# Patient Record
Sex: FEMALE | Race: WHITE | NOT HISPANIC OR LATINO | Employment: FULL TIME | URBAN - METROPOLITAN AREA
[De-identification: names, ages, dates, MRNs, and addresses within clinical notes are randomized per-mention and may not be internally consistent; named-entity substitution may affect disease eponyms.]

---

## 2017-01-03 ENCOUNTER — TELEPHONE (OUTPATIENT)
Dept: OBSTETRICS AND GYNECOLOGY | Age: 42
End: 2017-01-03

## 2017-01-11 ENCOUNTER — OFFICE VISIT (OUTPATIENT)
Dept: OBSTETRICS AND GYNECOLOGY | Age: 42
End: 2017-01-11

## 2017-01-11 VITALS
HEIGHT: 65 IN | BODY MASS INDEX: 22.33 KG/M2 | SYSTOLIC BLOOD PRESSURE: 112 MMHG | DIASTOLIC BLOOD PRESSURE: 72 MMHG | WEIGHT: 134 LBS

## 2017-01-11 DIAGNOSIS — N94.19 DYSPAREUNIA DUE TO MEDICAL CONDITION IN FEMALE: ICD-10-CM

## 2017-01-11 DIAGNOSIS — K62.5 RECTAL BLEEDING: ICD-10-CM

## 2017-01-11 DIAGNOSIS — Z98.890 POST-OPERATIVE STATE: ICD-10-CM

## 2017-01-11 DIAGNOSIS — N80.9 ENDOMETRIOSIS DETERMINED BY LAPAROSCOPY: Primary | ICD-10-CM

## 2017-01-11 PROCEDURE — 99024 POSTOP FOLLOW-UP VISIT: CPT | Performed by: OBSTETRICS & GYNECOLOGY

## 2017-01-11 NOTE — MR AVS SNAPSHOT
Alyssa HERNANDEZ Francine   1/11/2017 8:45 AM   Office Visit    Dept Phone:  [phone]   Encounter #:  [id number]    Provider:  Cesia Huerta MD   Department:  [facility]                Your Full Care Plan              Your Updated Medication List          This list is accurate as of: 1/11/17  9:05 AM.  Always use your most recent med list.                buPROPion  MG 24 hr tablet   Commonly known as:  WELLBUTRIN XL       FLUoxetine 20 MG capsule   Commonly known as:  PROzac       ibuprofen 600 MG tablet   Commonly known as:  ADVIL,MOTRIN   Take 1 tablet by mouth Every 6 (Six) Hours As Needed for mild pain (1-3).       loratadine 10 MG tablet   Commonly known as:  CLARITIN       Progesterone Micronized 10 % cream       saccharomyces boulardii 250 MG capsule   Commonly known as:  FLORASTOR       traZODone 100 MG tablet   Commonly known as:  DESYREL               You Were Diagnosed With        Codes Comments    Rectal bleeding    -  Primary ICD-10-CM: K62.5  ICD-9-CM: 569.3       Instructions     None    Patient Instructions History      Upcoming Appointments     Visit Type Date Time Department    POST-OP 1/11/2017  8:45 AM [facility]      [employer] Signup     Our records indicate that you have an active [employer] account.    You can view your After Visit Summary by going to [employer] and logging in with your [employer] username and password.  If you don't have a [employer] username and password but a parent or guardian has access to your record, the parent or guardian should login with their own [employer] username and password and access your record to view the After Visit Summary.    If you have questions, you can email [email or web address] or call [phone] to talk to our [employer] staff.  Remember, [employer] is NOT to be used for urgent needs.  For medical emergencies, dial 911.               Other Info from Your Visit  "          Allergies     Ciprofloxacin  Other (See Comments)    BODY ACHES    Metronidazole  Hives      Reason for Visit     Post-op constipation       Vital Signs     Blood Pressure Height Weight Last Menstrual Period Body Mass Index Smoking Status    112/72 65\" (165.1 cm) 134 lb (60.8 kg) 12/13/2016 22.3 kg/m2 Never Smoker      Problems and Diagnoses Noted     Rectal bleeding    -  Primary        "

## 2017-01-11 NOTE — PROGRESS NOTES
"Subjective     Alyssa Escamilla is a 41 y.o. female who presents to the clinic 4 weeks status post laparoscopic bilateral salpingectomy for sterilization. Endometriosis noted during surgery and implants cauterized.  Eating a regular diet without difficulty. Bowel movements are abnormal with consitpation and rectal bleeding (bright red). The patient is not having any pain. Pt had h/o severe dysmenorrhea and dyspareunia. Declines hormones for haven't tried ocps.    The following portions of the patient's history were reviewed and updated as appropriate: allergies, current medications, past family history, past medical history, past social history, past surgical history and problem list.    Review of Systems  Pertinent items are noted in HPI.      Objective       Visit Vitals   • /72   • Ht 65\" (165.1 cm)   • Wt 134 lb (60.8 kg)   • LMP 12/13/2016   • BMI 22.3 kg/m2     General:  alert, appears stated age and cooperative   Abdomen: soft, bowel sounds active, non-tender   Incision:   healing well, no drainage, no erythema, no hernia, no seroma, no swelling, well approximated, no dehiscence, incision well approximated         Assessment      Doing well postoperatively.  Operative findings again reviewed. Pathology report discussed (tubes benign) pictures given     Plan     1. Continue any current medications.  2. Wound care discussed.  3. Activity restrictions: none  4. Anticipated return to work: not applicable.  5. Follow up: annual and prn    Discussed endometriosis, offer depot lupron for tx, info give, could also try progesterone only pills for menorrhagia/dysmenorrhea but pt has declines hormones in past, will also refer to colorectal surg for bright red rectal bleeding  "

## 2017-02-24 ENCOUNTER — OFFICE VISIT (OUTPATIENT)
Dept: SURGERY | Facility: CLINIC | Age: 42
End: 2017-02-24

## 2017-02-24 VITALS
SYSTOLIC BLOOD PRESSURE: 122 MMHG | DIASTOLIC BLOOD PRESSURE: 74 MMHG | OXYGEN SATURATION: 98 % | RESPIRATION RATE: 20 BRPM | TEMPERATURE: 98.5 F | WEIGHT: 131 LBS | HEART RATE: 88 BPM | HEIGHT: 65 IN | BODY MASS INDEX: 21.83 KG/M2

## 2017-02-24 DIAGNOSIS — K62.5 RECTUM BLEEDING: Primary | ICD-10-CM

## 2017-02-24 PROCEDURE — 99244 OFF/OP CNSLTJ NEW/EST MOD 40: CPT | Performed by: COLON & RECTAL SURGERY

## 2017-02-24 PROCEDURE — 46600 DIAGNOSTIC ANOSCOPY SPX: CPT | Performed by: COLON & RECTAL SURGERY

## 2017-02-24 NOTE — PROGRESS NOTES
Alyssa Escamilla is a 41 y.o. female who is seen as a consult at the request of Cesia Huerta MD for Rectal Bleeding.      HPI:      Past Medical History   Diagnosis Date   • Allergic rhinitis    • Anxiety    • Anxiety and depression    • Cataract      LIGIA   • Chronic headache    • Depression    • Drug overdose, intentional 2016   • Endometriosis    • Fatigue    • IBS (irritable bowel syndrome)    • Infectious colitis    • Insomnia    • Lower back pain    • Occipital neuralgia    • Proteinuria    • Recovering alcoholic in remission      OVER 100 DAYS   • Substance abuse      ALCOHOL IN REMISSION QUIT 3/23/2004   • Vaginal candidiasis        Past Surgical History   Procedure Laterality Date   •  section     • Diagnostic laparoscopy Bilateral 2016     Procedure: SALPINGECTOMY LAPAROSCOPIC;  Surgeon: Cesia Huerta MD;  [place] [facility] [place] [facility] [place]:    • Contraceptive capsule removal  10/13/2016     IUD REMOVAL    • Salpingectomy Bilateral    • Colonoscopy              Social History:   reports that she has quit smoking. She quit after 10.00 years of use. She has never used smokeless tobacco. She reports that she does not drink alcohol or use illicit drugs.      Marriage status:     Family History   Problem Relation Age of Onset   • Hypertension Other    • Depression Other    • Hyperlipidemia Other    • Colon polyps Mother    • Prostate cancer Father    • Heart disease Father    • Diabetes Sister          Current Outpatient Prescriptions:   •  buPROPion XL (WELLBUTRIN XL) 300 MG 24 hr tablet, Take 300 mg by mouth Every Morning., Disp: , Rfl:   •  FLUoxetine (PROzac) 20 MG capsule, Take 40 mg by mouth Every Morning. Takes 40 mg during menstrual cycle., Disp: , Rfl:   •  ibuprofen (ADVIL,MOTRIN) 600 MG tablet, Take 1 tablet by mouth Every 6 (Six) Hours As Needed for mild pain (1-3)., Disp: 30 tablet, Rfl: 0  •  loratadine (CLARITIN) 10 MG tablet, Take 10 mg by  mouth Daily As Needed., Disp: , Rfl:   •  Progesterone Micronized 10 % cream, Place 1 application on the skin 2 (Two) Times a Day., Disp: , Rfl:   •  saccharomyces boulardii (FLORASTOR) 250 MG capsule, Take 250 mg by mouth Every Morning., Disp: , Rfl:   •  traZODone (DESYREL) 100 MG tablet, Take 100 mg by mouth Every Night., Disp: , Rfl:     Allergy  Ciprofloxacin and Metronidazole    Review of Systems   HENT: Positive for headaches.    Hematologic/Lymphatic: Bruises/bleeds easily.   Musculoskeletal: Positive for back pain and joint pain.   Gastrointestinal: Positive for abdominal pain, change in bowel habit and constipation.   Psychiatric/Behavioral: The patient has insomnia.    All other systems reviewed and are negative.      Vitals:    02/24/17 0839   BP: 122/74   Pulse: 88   Resp: 20   Temp: 98.5 °F (36.9 °C)   SpO2: 98%     Body mass index is 21.8 kg/(m^2).    Physical Exam    Review of Medical Record:    Assessment:  No diagnosis found.    Plan:      EMR Dragon/Transcription disclaimer:   Much of this encounter note is an electronic transcription/translation of spoken language to printed text. The electronic translation of spoken language may permit erroneous, or at times, nonsensical words or phrases to be inadvertently transcribed; Although I have reviewed the note for such errors, some may still exist.

## 2017-02-24 NOTE — PROGRESS NOTES
Alyssa Escamilla is a 41 y.o. female who is seen as a consult at the request of Cesia Huerta MD for Rectal Bleeding.      HPI:  Pt c/o rectal bleeding.  It happened after gyn surgery.  H/o polyps  H/o ibs  No meds to control ibs  Blood with bm - few days after surgery  Constipation worsening since surgery  Tried glycerin supp  Fleet - helped a little  One ducoloax - cramping and vomitting  miralax - helped  denies abd pain or cramping  A lot of straining  bm from [place] 1-4  Rb last episode this am - on tp    Fiber - no   Ss- miralax  Probiotic no    No cream  No swelling or tissue perianally      Past Medical History   Diagnosis Date   • Allergic rhinitis    • Anxiety    • Anxiety and depression    • Cataract      LIGIA   • Chronic headache    • Depression    • Drug overdose, intentional 2016   • Endometriosis    • Fatigue    • IBS (irritable bowel syndrome)    • Infectious colitis    • Insomnia    • Lower back pain    • Occipital neuralgia    • Proteinuria    • Recovering alcoholic in remission      OVER 100 DAYS   • Substance abuse      ALCOHOL IN REMISSION QUIT 3/23/2004   • Vaginal candidiasis        Past Surgical History   Procedure Laterality Date   •  section     • Diagnostic laparoscopy Bilateral 2016     Procedure: SALPINGECTOMY LAPAROSCOPIC;  Surgeon: Cesia Huerta MD;  [place] [facility] [place] [facility] [place];  Service:    • Contraceptive capsule removal  10/13/2016     IUD REMOVAL    • Salpingectomy Bilateral    • Colonoscopy              Social History:   reports that she has quit smoking. She quit after 10.00 years of use. She has never used smokeless tobacco. She reports that she does not drink alcohol or use illicit drugs.      Marriage status:     Family History   Problem Relation Age of Onset   • Hypertension Other    • Depression Other    • Hyperlipidemia Other    • Colon polyps Mother    • Prostate cancer Father    • Heart disease Father    • Diabetes  Sister          Current Outpatient Prescriptions:   •  buPROPion XL (WELLBUTRIN XL) 300 MG 24 hr tablet, Take 300 mg by mouth Every Morning., Disp: , Rfl:   •  FLUoxetine (PROzac) 20 MG capsule, Take 40 mg by mouth Every Morning. Takes 40 mg during menstrual cycle., Disp: , Rfl:   •  ibuprofen (ADVIL,MOTRIN) 600 MG tablet, Take 1 tablet by mouth Every 6 (Six) Hours As Needed for mild pain (1-3)., Disp: 30 tablet, Rfl: 0  •  loratadine (CLARITIN) 10 MG tablet, Take 10 mg by mouth Daily As Needed., Disp: , Rfl:   •  Progesterone Micronized 10 % cream, Place 1 application on the skin 2 (Two) Times a Day., Disp: , Rfl:   •  saccharomyces boulardii (FLORASTOR) 250 MG capsule, Take 250 mg by mouth Every Morning., Disp: , Rfl:   •  traZODone (DESYREL) 100 MG tablet, Take 100 mg by mouth Every Night., Disp: , Rfl:     Allergy  Ciprofloxacin and Metronidazole    Review of Systems   Constitution: Negative for decreased appetite, weakness and weight gain.   HENT: Positive for headaches. Negative for congestion, hearing loss and hoarse voice.    Eyes: Negative for blurred vision, discharge and visual disturbance.   Cardiovascular: Negative for chest pain, cyanosis and leg swelling.   Respiratory: Negative for cough, shortness of breath, sleep disturbances due to breathing and snoring.    Endocrine: Negative for cold intolerance and heat intolerance.   Hematologic/Lymphatic: Bruises/bleeds easily.   Skin: Negative for itching, poor wound healing and skin cancer.   Musculoskeletal: Positive for back pain and joint pain. Negative for arthritis and joint swelling.   Gastrointestinal: Positive for abdominal pain, change in bowel habit and constipation. Negative for bowel incontinence.   Genitourinary: Negative for bladder incontinence, dysuria and hematuria.   Neurological: Negative for brief paralysis, excessive daytime sleepiness, dizziness, focal weakness and light-headedness.   Psychiatric/Behavioral: Negative for altered mental  status and hallucinations. The patient has insomnia.    Allergic/Immunologic: Negative for HIV exposure and persistent infections.   All other systems reviewed and are negative.      Vitals:    02/24/17 0839   BP: 122/74   Pulse: 88   Resp: 20   Temp: 98.5 °F (36.9 °C)   SpO2: 98%     Body mass index is 21.8 kg/(m^2).    Physical Exam   Constitutional: She is oriented to person, place, and time. She appears well-developed and well-nourished. No distress.   HENT:   Head: Normocephalic and atraumatic.   Nose: Nose normal.   Mouth/Throat: Oropharynx is clear and moist.   Eyes: Conjunctivae and EOM are normal. Pupils are equal, round, and reactive to light.   Neck: Normal range of motion. No tracheal deviation present.   Pulmonary/Chest: Effort normal and breath sounds normal. No respiratory distress.   Abdominal: Soft. Bowel sounds are normal. She exhibits no distension.   Genitourinary:   Genitourinary Comments: Perianal exam: external hem - ra tag minor  BASILIO- good tone, no masses  Anoscopy performed:  Grade 2 x 1 internal hem     Musculoskeletal: Normal range of motion. She exhibits no edema or deformity.   Neurological: She is alert and oriented to person, place, and time. No cranial nerve deficit. Coordination and gait normal.   Skin: Skin is warm and dry.   Psychiatric: She has a normal mood and affect. Her behavior is normal. Judgment normal.       Assessment:  1. Rectum bleeding        Plan:  For the rectal bleeding, I recommend colonoscopy to rule out major sources of bleeding other than hemorrhoids. At the time of colonoscopy if there are no serious issues found at that time, I recommend doing rubber band ligation of the enlarged internal hemorrhoids.  I described risk, benefits and alternatives to the patient.  I described to patient typical post procedure recovery, typical outcomes, and 85% success rate. The patient wishes to proceed.      EMR Dragon/Transcription disclaimer:   Much of this encounter note is  an electronic transcription/translation of spoken language to printed text. The electronic translation of spoken language may permit erroneous, or at times, nonsensical words or phrases to be inadvertently transcribed; Although I have reviewed the note for such errors, some may still exist.

## 2017-04-04 ENCOUNTER — OFFICE VISIT (OUTPATIENT)
Dept: SURGERY | Facility: CLINIC | Age: 42
End: 2017-04-04

## 2017-04-04 VITALS
SYSTOLIC BLOOD PRESSURE: 118 MMHG | DIASTOLIC BLOOD PRESSURE: 76 MMHG | HEART RATE: 84 BPM | HEIGHT: 65 IN | BODY MASS INDEX: 21.33 KG/M2 | TEMPERATURE: 98.2 F | OXYGEN SATURATION: 98 % | WEIGHT: 128 LBS

## 2017-04-04 DIAGNOSIS — K59.09 CHRONIC CONSTIPATION: ICD-10-CM

## 2017-04-04 DIAGNOSIS — K64.4 ANAL SKIN TAG: ICD-10-CM

## 2017-04-04 DIAGNOSIS — K62.89 RECTAL PAIN: Primary | ICD-10-CM

## 2017-04-04 PROCEDURE — 99213 OFFICE O/P EST LOW 20 MIN: CPT | Performed by: PHYSICIAN ASSISTANT

## 2017-04-04 RX ORDER — HYDROCORTISONE ACETATE 25 MG/1
25 SUPPOSITORY RECTAL 2 TIMES DAILY
Qty: 20 SUPPOSITORY | Refills: 0 | Status: SHIPPED | OUTPATIENT
Start: 2017-04-04 | End: 2017-04-11

## 2017-04-04 RX ORDER — FLUOXETINE HYDROCHLORIDE 40 MG/1
CAPSULE ORAL
COMMUNITY
Start: 2017-03-24 | End: 2017-04-04 | Stop reason: SDUPTHER

## 2017-04-04 NOTE — PROGRESS NOTES
"Alyssa Escamilla is a 41 y.o. female in for follow up of rectal pain    Patient states that she has had a new episode of rectal pain    Had previously canceled colonoscopy with rubber band ligation due to insurance deductible issues    This weekend, she had an episode of bad stomach cramping  She was on the toilet for 45 minutes     Had bowel movement, which was painful    Stool soft    After bowel movement, in shower, felt grape-sized bump at anus    No blood    No drainage    No fever or chills    No nausea or vomiting    Usually has one bowel movement per week    Long-standing history of constipation    She does not think that MiraLAX has been helpful    Admits has not been \"diligent\" about fiber    As used Preparation H cream, in case tissue at anus was swelling  Denies anorectal pain or itching except with bowel movement      /76  Pulse 84  Temp 98.2 °F (36.8 °C)  Ht 65\" (165.1 cm)  Wt 128 lb (58.1 kg)  SpO2 98%  BMI 21.3 kg/m2  Body mass index is 21.3 kg/(m^2).      PE:  Physical Exam   Constitutional: She is oriented to person, place, and time. She appears well-developed and well-nourished. No distress.   HENT:   Head: Normocephalic and atraumatic.   Eyes: Pupils are equal, round, and reactive to light.   Neck: No tracheal deviation present.   Pulmonary/Chest: Effort normal. No respiratory distress.   Abdominal:   -soft, non-tender, non-distended   Genitourinary:   Genitourinary Comments: Perianal exam: external: tag mildly inflamed  BASILIO- good tone, no masses  No blood or drainage.  No fluctuance or induration.   Neurological: She is alert and oriented to person, place, and time.   Skin: Skin is warm and dry. She is not diaphoretic.   Psychiatric: She has a normal mood and affect. Her behavior is normal.   Vitals reviewed.        Assessment:   1. Rectal pain    2. Chronic constipation    3. Anal skin tag         Plan:    -rx anusol suppositories bid x 10 d for anal swelling/discomfort.  " Discussed that Anusol suppositories will help calm inflammation in the short term, but she needs to address root cause of problem, which is constipation/straining  -rx Linzess for chronic constipation, as symptoms not well managed with MiraLAX stool softener and fiber  -reschedule colonoscopy with rubber band ligation  -discussed return precautions, including but not limited to: call or come in if any fever/chills, nausea/vomiting, new/worsening rectal pain or pressure, new/worsening anorectal bleeding or drainage (especially if malodorous), difficulty with bowel/bladder function. Patient verbalizes understanding, and states that she will call or come in if she experiences these or any other concerning symptoms    RTC 4 weeks    Blanka De Anda PA-C 4/4/2017  12:46 PM     20 minutes spent face-to-face with patient, 15 of 20 minutes spent in counseling    EMR Dragon/Transcription disclaimer:   Much of this encounter note is an electronic transcription/translation of spoken language to printed text. The electronic translation of spoken language may permit erroneous, or at times, nonsensical words or phrases to be inadvertently transcribed; Although I have reviewed the note for such errors, some may still exist.

## 2017-04-20 ENCOUNTER — HOSPITAL ENCOUNTER (OUTPATIENT)
Facility: HOSPITAL | Age: 42
Setting detail: HOSPITAL OUTPATIENT SURGERY
Discharge: HOME | End: 2017-04-20
Attending: COLON & RECTAL SURGERY | Admitting: COLON & RECTAL SURGERY

## 2017-04-20 ENCOUNTER — ANESTHESIA EVENT (OUTPATIENT)
Dept: GASTROENTEROLOGY | Facility: HOSPITAL | Age: 42
End: 2017-04-20

## 2017-04-20 ENCOUNTER — ANESTHESIA (OUTPATIENT)
Dept: GASTROENTEROLOGY | Facility: HOSPITAL | Age: 42
End: 2017-04-20

## 2017-04-20 VITALS
WEIGHT: 128.13 LBS | SYSTOLIC BLOOD PRESSURE: 108 MMHG | TEMPERATURE: 98.2 F | HEART RATE: 67 BPM | OXYGEN SATURATION: 100 % | HEIGHT: 65 IN | DIASTOLIC BLOOD PRESSURE: 79 MMHG | BODY MASS INDEX: 21.35 KG/M2 | RESPIRATION RATE: 16 BRPM

## 2017-04-20 DIAGNOSIS — K62.5 RECTUM BLEEDING: ICD-10-CM

## 2017-04-20 LAB
B-HCG UR QL: NEGATIVE
INTERNAL NEGATIVE CONTROL: NEGATIVE
INTERNAL POSITIVE CONTROL: POSITIVE
Lab: NORMAL

## 2017-04-20 PROCEDURE — 25010000002 PROPOFOL 10 MG/ML EMULSION: Performed by: ANESTHESIOLOGY

## 2017-04-20 PROCEDURE — 45398 COLONOSCOPY W/BAND LIGATION: CPT | Performed by: COLON & RECTAL SURGERY

## 2017-04-20 RX ORDER — OXYCODONE HYDROCHLORIDE AND ACETAMINOPHEN 5; 325 MG/1; MG/1
TABLET ORAL
Qty: 24 TABLET | Refills: 0 | Status: SHIPPED | OUTPATIENT
Start: 2017-04-20 | End: 2018-03-30

## 2017-04-20 RX ORDER — LIDOCAINE HYDROCHLORIDE 20 MG/ML
INJECTION, SOLUTION INFILTRATION; PERINEURAL AS NEEDED
Status: DISCONTINUED | OUTPATIENT
Start: 2017-04-20 | End: 2017-04-20 | Stop reason: SURG

## 2017-04-20 RX ORDER — FENTANYL CITRATE 50 UG/ML
25 INJECTION, SOLUTION INTRAMUSCULAR; INTRAVENOUS
Status: DISCONTINUED | OUTPATIENT
Start: 2017-04-20 | End: 2017-04-20 | Stop reason: HOSPADM

## 2017-04-20 RX ORDER — SODIUM CHLORIDE, SODIUM LACTATE, POTASSIUM CHLORIDE, CALCIUM CHLORIDE 600; 310; 30; 20 MG/100ML; MG/100ML; MG/100ML; MG/100ML
1000 INJECTION, SOLUTION INTRAVENOUS CONTINUOUS PRN
Status: DISCONTINUED | OUTPATIENT
Start: 2017-04-20 | End: 2017-04-20 | Stop reason: HOSPADM

## 2017-04-20 RX ORDER — ONDANSETRON 2 MG/ML
4 INJECTION INTRAMUSCULAR; INTRAVENOUS ONCE AS NEEDED
Status: DISCONTINUED | OUTPATIENT
Start: 2017-04-20 | End: 2017-04-20 | Stop reason: HOSPADM

## 2017-04-20 RX ORDER — PROPOFOL 10 MG/ML
VIAL (ML) INTRAVENOUS CONTINUOUS PRN
Status: DISCONTINUED | OUTPATIENT
Start: 2017-04-20 | End: 2017-04-20 | Stop reason: SURG

## 2017-04-20 RX ORDER — BUPIVACAINE HYDROCHLORIDE AND EPINEPHRINE 5; 5 MG/ML; UG/ML
INJECTION, SOLUTION EPIDURAL; INTRACAUDAL; PERINEURAL AS NEEDED
Status: DISCONTINUED | OUTPATIENT
Start: 2017-04-20 | End: 2017-04-20 | Stop reason: HOSPADM

## 2017-04-20 RX ORDER — PROPOFOL 10 MG/ML
VIAL (ML) INTRAVENOUS AS NEEDED
Status: DISCONTINUED | OUTPATIENT
Start: 2017-04-20 | End: 2017-04-20 | Stop reason: SURG

## 2017-04-20 RX ADMIN — SODIUM CHLORIDE, POTASSIUM CHLORIDE, SODIUM LACTATE AND CALCIUM CHLORIDE 1000 ML: 600; 310; 30; 20 INJECTION, SOLUTION INTRAVENOUS at 13:56

## 2017-04-20 RX ADMIN — PROPOFOL 160 MCG/KG/MIN: 10 INJECTION, EMULSION INTRAVENOUS at 15:30

## 2017-04-20 RX ADMIN — LIDOCAINE HYDROCHLORIDE 100 MG: 20 INJECTION, SOLUTION INFILTRATION; PERINEURAL at 15:17

## 2017-04-20 RX ADMIN — PROPOFOL 200 MG: 10 INJECTION, EMULSION INTRAVENOUS at 15:17

## 2017-04-20 RX ADMIN — PROPOFOL 300 MCG/KG/MIN: 10 INJECTION, EMULSION INTRAVENOUS at 15:17

## 2017-04-20 NOTE — DISCHARGE INSTRUCTIONS
For the next 24 hours patient needs to be with a responsible adult.    For 24 hours DO NOT drive, operate machinery, appliances, drink alcohol, make important decisions or sign legal documents.    Start with a light or bland diet and advance to regular diet as tolerated.    Follow recommendations on procedure report provided by your doctor.    Call Dr Avery for problems [phone]    Problems may include but not limited to: large amounts of bleeding, trouble breathing, repeated vomiting, severe unrelieved pain, fever or chills.

## 2017-04-20 NOTE — PLAN OF CARE
Problem: Patient Care Overview (Adult)  Goal: Plan of Care Review  Outcome: Ongoing (interventions implemented as appropriate)    04/20/17 1332   Coping/Psychosocial Response Interventions   Plan Of Care Reviewed With patient   Patient Care Overview   Progress no change       Goal: Adult Individualization and Mutuality  Outcome: Ongoing (interventions implemented as appropriate)  Goal: Discharge Needs Assessment  Outcome: Ongoing (interventions implemented as appropriate)    Problem: GI Endoscopy (Adult)  Goal: Signs and Symptoms of Listed Potential Problems Will be Absent or Manageable (GI Endoscopy)  Outcome: Ongoing (interventions implemented as appropriate)

## 2017-04-20 NOTE — H&P
Alyssa Escamilla is a 41 y.o. female who is seen as a consult at the request of Cesia Huerta MD for Rectal Bleeding.      HPI: Pt c/o rectal bleeding. It happened after gyn surgery.  H/o polyps  H/o ibs  No meds to control ibs  Blood with bm - few days after surgery  Constipation worsening since surgery  Tried glycerin supp  Fleet - helped a little  One ducoloax - cramping and vomitting  miralax - helped  denies abd pain or cramping  A lot of straining  bm from bristol 1-4  Rb last episode this am - on tp     Fiber - no   Ss- miralax  Probiotic no     No cream  No swelling or tissue perianally         Medical History    Past Medical History   Diagnosis Date   • Allergic rhinitis     • Anxiety     • Anxiety and depression     • Cataract         LIGIA   • Chronic headache     • Depression     • Drug overdose, intentional 2016   • Endometriosis     • Fatigue     • IBS (irritable bowel syndrome)     • Infectious colitis     • Insomnia     • Lower back pain     • Occipital neuralgia     • Proteinuria     • Recovering alcoholic in remission         OVER 100 DAYS   • Substance abuse         ALCOHOL IN REMISSION QUIT 3/23/2004   • Vaginal candidiasis               Surgical History           Past Surgical History   Procedure Laterality Date   •  section       • Diagnostic laparoscopy Bilateral 2016       Procedure: SALPINGECTOMY LAPAROSCOPIC; Surgeon: Cesia Huerta MD; [place] [facility] [place] [facility] [place]:    • Contraceptive capsule removal   10/13/2016       IUD REMOVAL    • Salpingectomy Bilateral     • Colonoscopy                       Social History:   reports that she has quit smoking. She quit after 10.00 years of use. She has never used smokeless tobacco. She reports that she does not drink alcohol or use illicit drugs.        Marriage status:            Family History   Problem Relation Age of Onset   • Hypertension Other     • Depression Other     • Hyperlipidemia  Other     • Colon polyps Mother     • Prostate cancer Father     • Heart disease Father     • Diabetes Sister              Current Outpatient Prescriptions:   • buPROPion XL (WELLBUTRIN XL) 300 MG 24 hr tablet, Take 300 mg by mouth Every Morning., Disp: , Rfl:   • FLUoxetine (PROzac) 20 MG capsule, Take 40 mg by mouth Every Morning. Takes 40 mg during menstrual cycle., Disp: , Rfl:   • ibuprofen (ADVIL,MOTRIN) 600 MG tablet, Take 1 tablet by mouth Every 6 (Six) Hours As Needed for mild pain (1-3)., Disp: 30 tablet, Rfl: 0  • loratadine (CLARITIN) 10 MG tablet, Take 10 mg by mouth Daily As Needed., Disp: , Rfl:   • Progesterone Micronized 10 % cream, Place 1 application on the skin 2 (Two) Times a Day., Disp: , Rfl:   • saccharomyces boulardii (FLORASTOR) 250 MG capsule, Take 250 mg by mouth Every Morning., Disp: , Rfl:   • traZODone (DESYREL) 100 MG tablet, Take 100 mg by mouth Every Night., Disp: , Rfl:      Allergy  Ciprofloxacin and Metronidazole     Review of Systems   Constitution: Negative for decreased appetite, weakness and weight gain.   HENT: Positive for headaches. Negative for congestion, hearing loss and hoarse voice.   Eyes: Negative for blurred vision, discharge and visual disturbance.   Cardiovascular: Negative for chest pain, cyanosis and leg swelling.   Respiratory: Negative for cough, shortness of breath, sleep disturbances due to breathing and snoring.   Endocrine: Negative for cold intolerance and heat intolerance.   Hematologic/Lymphatic: Bruises/bleeds easily.   Skin: Negative for itching, poor wound healing and skin cancer.   Musculoskeletal: Positive for back pain and joint pain. Negative for arthritis and joint swelling.   Gastrointestinal: Positive for abdominal pain, change in bowel habit and constipation. Negative for bowel incontinence.   Genitourinary: Negative for bladder incontinence, dysuria and hematuria.   Neurological: Negative for brief paralysis, excessive daytime sleepiness,  "dizziness, focal weakness and light-headedness.   Psychiatric/Behavioral: Negative for altered mental status and hallucinations. The patient has insomnia.   Allergic/Immunologic: Negative for HIV exposure and persistent infections.   All other systems reviewed and are negative.        BP 94/73 (BP Location: Left arm, Patient Position: Lying)  Pulse 68  Temp 98.2 °F (36.8 °C) (Oral)   Resp 18  Ht 64.5\" (163.8 cm)  Wt 128 lb 2 oz (58.1 kg)  LMP 04/06/2017  SpO2 99%  BMI 21.65 kg/m2    Physical Exam   Constitutional: She is oriented to person, place, and time. She appears well-developed and well-nourished. No distress.   HENT:   Head: Normocephalic and atraumatic.   Nose: Nose normal.   Mouth/Throat: Oropharynx is clear and moist.   Eyes: Conjunctivae and EOM are normal. Pupils are equal, round, and reactive to light.   Neck: Normal range of motion. No tracheal deviation present.   Pulmonary/Chest: Effort normal and breath sounds normal. No respiratory distress.   Abdominal: Soft. Bowel sounds are normal. She exhibits no distension.   Genitourinary:   Genitourinary Comments: Perianal exam: external hem - ra tag minor  BASILIO- good tone, no masses  Anoscopy performed: Grade 2 x 1 internal hem    Musculoskeletal: Normal range of motion. She exhibits no edema or deformity.   Neurological: She is alert and oriented to person, place, and time. No cranial nerve deficit. Coordination and gait normal.   Skin: Skin is warm and dry.   Psychiatric: She has a normal mood and affect. Her behavior is normal. Judgment normal.         Assessment:  1. Rectum bleeding          Plan:  For the rectal bleeding, I recommend colonoscopy to rule out major sources of bleeding other than hemorrhoids. At the time of colonoscopy if there are no serious issues found at that time, I recommend doing rubber band ligation of the enlarged internal hemorrhoids. I described risk, benefits and alternatives to the patient. I described to patient " typical post procedure recovery, typical outcomes, and 85% success rate. The patient wishes to proceed.

## 2017-04-20 NOTE — ANESTHESIA POSTPROCEDURE EVALUATION
Patient: Alyssa Escamilla    Procedure Summary     Date Anesthesia Start Anesthesia Stop Room / Location    04/20/17 1515 1541  [facility]       Procedure Diagnosis Surgeon Provider    HEMORRHOID BANDING x2 (N/A Rectum); COLONOSCOPY to cecum (N/A ) Rectum bleeding  (Rectum bleeding [K62.5]) MD Dakota Kaye MD          Anesthesia Type: MAC  Last vitals  /82 (04/20/17 1549)    Temp      Pulse 81 (04/20/17 1549)   Resp 16 (04/20/17 1549)    SpO2 99 % (04/20/17 1549)      Post Anesthesia Care and Evaluation    Patient location during evaluation: bedside  Pain management: adequate  Airway patency: patent  Anesthetic complications: No anesthetic complications    Cardiovascular status: acceptable  Respiratory status: acceptable  Hydration status: acceptable

## 2017-04-20 NOTE — NURSING NOTE
Spoke with Kimberly, psychiatric RN regarding patient intentional drug overdose on 8/3/2016. Patient currently having no thoughts of harming herself. RN states no further action is needed.

## 2017-04-20 NOTE — ANESTHESIA PREPROCEDURE EVALUATION
Anesthesia Evaluation     Patient summary reviewed and Nursing notes reviewed   NPO Status: > 8 hours   Airway   Mallampati: II  TM distance: <3 FB  Neck ROM: full  possible difficult intubation, Narrow palate and small opening  Dental - normal exam     Pulmonary - normal exam   Cardiovascular - normal exam        Neuro/Psych  (+) psychiatric history Depression and Anxiety,    GI/Hepatic/Renal/Endo      Musculoskeletal     Abdominal  - normal exam    Bowel sounds: normal.   Substance History   (+) alcohol use,      OB/GYN          Other                                    Anesthesia Plan    ASA 1     MAC     Anesthetic plan and risks discussed with patient.

## 2017-05-11 ENCOUNTER — HOSPITAL ENCOUNTER (EMERGENCY)
Facility: HOSPITAL | Age: 42
Discharge: HOME | End: 2017-05-11
Attending: EMERGENCY MEDICINE | Admitting: EMERGENCY MEDICINE

## 2017-05-11 VITALS
HEART RATE: 70 BPM | RESPIRATION RATE: 16 BRPM | OXYGEN SATURATION: 96 % | WEIGHT: 129 LBS | DIASTOLIC BLOOD PRESSURE: 80 MMHG | SYSTOLIC BLOOD PRESSURE: 101 MMHG | HEIGHT: 65 IN | TEMPERATURE: 97.4 F | BODY MASS INDEX: 21.49 KG/M2

## 2017-05-11 DIAGNOSIS — H00.033 CELLULITIS OF RIGHT EYELID: Primary | ICD-10-CM

## 2017-05-11 PROCEDURE — 99283 EMERGENCY DEPT VISIT LOW MDM: CPT

## 2017-05-11 RX ORDER — CLINDAMYCIN HYDROCHLORIDE 300 MG/1
300 CAPSULE ORAL ONCE
Status: COMPLETED | OUTPATIENT
Start: 2017-05-11 | End: 2017-05-11

## 2017-05-11 RX ORDER — FEXOFENADINE HYDROCHLORIDE 60 MG/1
60 TABLET, FILM COATED ORAL DAILY
COMMUNITY
End: 2018-03-30

## 2017-05-11 RX ORDER — CLINDAMYCIN HYDROCHLORIDE 300 MG/1
300 CAPSULE ORAL 3 TIMES DAILY
Qty: 30 CAPSULE | Refills: 0 | Status: SHIPPED | OUTPATIENT
Start: 2017-05-11 | End: 2018-03-30

## 2017-05-11 RX ORDER — CEPHALEXIN 250 MG/1
250 CAPSULE ORAL 4 TIMES DAILY
COMMUNITY
End: 2018-03-30

## 2017-05-11 RX ADMIN — CLINDAMYCIN HYDROCHLORIDE 300 MG: 300 CAPSULE ORAL at 22:31

## 2017-05-12 ENCOUNTER — OFFICE VISIT (OUTPATIENT)
Dept: SURGERY | Facility: CLINIC | Age: 42
End: 2017-05-12

## 2017-05-12 VITALS
WEIGHT: 131.5 LBS | BODY MASS INDEX: 21.88 KG/M2 | HEART RATE: 79 BPM | DIASTOLIC BLOOD PRESSURE: 60 MMHG | OXYGEN SATURATION: 98 % | SYSTOLIC BLOOD PRESSURE: 108 MMHG | TEMPERATURE: 97.6 F

## 2017-05-12 DIAGNOSIS — K59.09 CHRONIC CONSTIPATION: ICD-10-CM

## 2017-05-12 DIAGNOSIS — K62.89 RECTAL PAIN: Primary | ICD-10-CM

## 2017-05-12 DIAGNOSIS — K64.8 INTERNAL HEMORRHOIDS WITH COMPLICATION: ICD-10-CM

## 2017-05-12 PROCEDURE — 99212 OFFICE O/P EST SF 10 MIN: CPT | Performed by: COLON & RECTAL SURGERY

## 2018-03-30 ENCOUNTER — OFFICE VISIT (OUTPATIENT)
Dept: OBSTETRICS AND GYNECOLOGY | Age: 43
End: 2018-03-30

## 2018-03-30 VITALS
SYSTOLIC BLOOD PRESSURE: 110 MMHG | BODY MASS INDEX: 23.49 KG/M2 | HEIGHT: 65 IN | WEIGHT: 141 LBS | DIASTOLIC BLOOD PRESSURE: 72 MMHG

## 2018-03-30 DIAGNOSIS — Z01.419 WELL WOMAN EXAM WITH ROUTINE GYNECOLOGICAL EXAM: Primary | ICD-10-CM

## 2018-03-30 DIAGNOSIS — N94.6 DYSMENORRHEA: ICD-10-CM

## 2018-03-30 DIAGNOSIS — Z11.51 ENCOUNTER FOR SCREENING FOR HUMAN PAPILLOMAVIRUS (HPV): ICD-10-CM

## 2018-03-30 DIAGNOSIS — N92.0 MENORRHAGIA WITH REGULAR CYCLE: ICD-10-CM

## 2018-03-30 PROCEDURE — 99396 PREV VISIT EST AGE 40-64: CPT | Performed by: OBSTETRICS & GYNECOLOGY

## 2018-03-30 RX ORDER — FLUOXETINE HYDROCHLORIDE 40 MG/1
CAPSULE ORAL
COMMUNITY
Start: 2018-03-03

## 2018-03-30 NOTE — PROGRESS NOTES
Chief complaint: annual    Subjective   History of Present Illness    Alyssa Escamilla is a 42 y.o.  who presents for annual exam.  Her menses are regular every 28-30 days, lasting 6 days. Has menorrhagia and dysmenorrhea. Has pelvic pain before menses. Dx w/ endometriosis at time of laparoscopic bilateral salpingectomy for sterilization. Discussed depo lupron or laparoscopic laser tx by [facility]. Pt has wanted to avoid hormones due to severe depression.    Obstetric History:  OB History      Para Term  AB Living    1 1 0 1  1    SAB TAB Ectopic Molar Multiple Live Births         1         Menstrual History:     Patient's last menstrual period was 2018.         Current contraception: tubal sterilization  History of abnormal Pap smear: no  Received Gardasil immunization: no  Perform regular self breast exam: yes -    Family history of uterine or ovarian cancer: no  Family History of colon cancer: no  Family history of breast cancer: no    Mammogram: done today.  Colonoscopy: up to date.  DEXA: not indicated.    Exercise: moderately active  Calcium/Vitamin D: adequate intake    The following portions of the patient's history were reviewed and updated as appropriate: allergies, current medications, past family history, past medical history, past social history, past surgical history and problem list.    Review of Systems   Constitutional: Negative for activity change, fatigue, fever and unexpected weight change.   Respiratory: Negative for chest tightness and shortness of breath.    Cardiovascular: Negative for chest pain, palpitations and leg swelling.   Gastrointestinal: Negative for abdominal distention, abdominal pain, blood in stool, constipation, diarrhea, nausea and vomiting.   Endocrine: Negative for cold intolerance, heat intolerance, polydipsia, polyphagia and polyuria.   Genitourinary: Negative.    Musculoskeletal: Negative for arthralgias and back pain.   Skin: Negative for color  "change.   Neurological: Negative for weakness and headaches.   Hematological: Does not bruise/bleed easily.   Psychiatric/Behavioral: Negative for confusion.       Pertinent items are noted in HPI.     Objective   Physical Exam    /72   Ht 165.1 cm (65\")   Wt 64 kg (141 lb)   LMP 03/30/2018   BMI 23.46 kg/m²     General:   alert, appears stated age and cooperative   Neck: no asymmetry, masses, or scars   Heart: regular rate and rhythm, S1, S2 normal, no murmur, click, rub or gallop   Lungs: clear to auscultation bilaterally   Abdomen: soft, non-tender, without masses or organomegaly   Breast: inspection negative, no nipple discharge or bleeding, no masses or nodularity palpable   Vulva: normal, Bartholin's, Urethra, Hemet's normal   Vagina: normal mucosa, moderate blood in vault   Cervix: no cervical motion tenderness and nulliparous appearance   Uterus: normal size, mobile, non-tender, normal shape and consistency   Adnexa: normal adnexa and no mass, fullness, tenderness   Rectal: not indicated     Assessment/Plan   Alyssa was seen today for gynecologic exam.    Diagnoses and all orders for this visit:    Well woman exam with routine gynecological exam  -     PapIG, HPV, Rfx 16 / 18    Encounter for screening for human papillomavirus (HPV)  -     PapIG, HPV, Rfx 16 / 18    Menorrhagia with regular cycle  -     Norethin-Eth Estrad-Fe Biphas 1 MG-10 MCG / 10 MCG tablet; Take 1 tablet by mouth Daily.    Dysmenorrhea  -     Norethin-Eth Estrad-Fe Biphas 1 MG-10 MCG / 10 MCG tablet; Take 1 tablet by mouth Daily.        Breast self exam technique reviewed and patient encouraged to perform self-exam monthly.  Discussed healthy lifestyle modifications.  Pap smear sent    Consider depo lupron and laparoscopic laser tx by [facility] (name given) for tx of pelvic pain due to endometriosis, pt desires to try low dose ocp first as it should also improve her menorrhagia and dysmenorrhea             "

## 2018-04-03 LAB
CYTOLOGIST CVX/VAG CYTO: NORMAL
CYTOLOGY CVX/VAG DOC THIN PREP: NORMAL
DX ICD CODE: NORMAL
HIV 1 & 2 AB SER-IMP: NORMAL
HPV I/H RISK 1 DNA CVX QL PROBE+SIG AMP: NEGATIVE
OTHER STN SPEC: NORMAL
PATH REPORT.FINAL DX SPEC: NORMAL
STAT OF ADQ CVX/VAG CYTO-IMP: NORMAL

## 2018-04-04 ENCOUNTER — TELEPHONE (OUTPATIENT)
Dept: OBSTETRICS AND GYNECOLOGY | Age: 43
End: 2018-04-04

## 2018-04-04 NOTE — TELEPHONE ENCOUNTER
----- Message from GEORGE Lundberg sent at 4/4/2018  1:03 PM EDT -----  Let her know her pap and hpv results are negative

## 2018-10-31 ENCOUNTER — TELEPHONE (OUTPATIENT)
Dept: OBSTETRICS AND GYNECOLOGY | Age: 43
End: 2018-10-31

## 2018-10-31 NOTE — TELEPHONE ENCOUNTER
Dr Rosaod pt has endometriosis and had discussed options, pt had forgotten the name of physician she would refer her to for surg. Please advise

## 2020-07-22 ENCOUNTER — OFFICE VISIT (OUTPATIENT)
Dept: OBSTETRICS AND GYNECOLOGY | Age: 45
End: 2020-07-22

## 2020-07-22 VITALS
WEIGHT: 138.4 LBS | BODY MASS INDEX: 23.06 KG/M2 | HEIGHT: 65 IN | DIASTOLIC BLOOD PRESSURE: 56 MMHG | SYSTOLIC BLOOD PRESSURE: 108 MMHG

## 2020-07-22 DIAGNOSIS — N92.0 MENORRHAGIA WITH REGULAR CYCLE: ICD-10-CM

## 2020-07-22 DIAGNOSIS — N80.9 ENDOMETRIOSIS DETERMINED BY LAPAROSCOPY: ICD-10-CM

## 2020-07-22 DIAGNOSIS — Z11.51 ENCOUNTER FOR SCREENING FOR HUMAN PAPILLOMAVIRUS (HPV): ICD-10-CM

## 2020-07-22 DIAGNOSIS — Z01.419 WELL WOMAN EXAM WITH ROUTINE GYNECOLOGICAL EXAM: Primary | ICD-10-CM

## 2020-07-22 PROCEDURE — 99396 PREV VISIT EST AGE 40-64: CPT | Performed by: OBSTETRICS & GYNECOLOGY

## 2020-07-22 RX ORDER — LEVOMEFOLATE/ALGAL OIL 7.5-90.314
1 CAPSULE ORAL DAILY
COMMUNITY
Start: 2019-08-23 | End: 2023-02-14

## 2020-07-22 NOTE — PROGRESS NOTES
"Chief complaint: annual    Subjective   History of Present Illness    Alyssa Escamilla is a 44 y.o.  who presents for annual exam. C/o menorrhagia and dysmenorrhea. Dx with endometriosis at time of salpingectomy for sterilization. Tried lo loestrin for 6 wk but dc'd due to side effects of hormones (moodiness). Declines further trial of hormones. Disliked paragard IUD in past so declines progesterone iud.  Her menses are regular every 28-30 days, lasting 5 days, menorrhagia for 2-3 days. C/o hot flashes and vaginal dryness.  Colonoscopy up to date (colon polyps)  Will schedule MMG (was rescheduled)  3/2018 pap normal HPV-    Obstetric History:  OB History        1    Para   1    Term   0       1    AB        Living   1       SAB        TAB        Ectopic        Molar        Multiple        Live Births   1               Menstrual History:     Patient's last menstrual period was 2020 (approximate).         Current contraception: laparoscopic salpingectomy  History of abnormal Pap smear: no  Received Gardasil immunization: no  Perform regular self breast exam: yes -    Family history of uterine or ovarian cancer: no  Family History of colon cancer: no  Family history of breast cancer: no    Mammogram: ordered.  Colonoscopy: up to date.  DEXA: not indicated.    Exercise: moderately active  Calcium/Vitamin D: adequate intake    The following portions of the patient's history were reviewed and updated as appropriate: allergies, current medications, past family history, past medical history, past social history, past surgical history and problem list.    Review of Systems    Pertinent items are noted in HPI.     Objective   Physical Exam    /56   Ht 165.1 cm (65\")   Wt 62.8 kg (138 lb 6.4 oz)   LMP 2020 (Approximate)   Breastfeeding No   BMI 23.03 kg/m²     General:   alert, appears stated age and cooperative   Neck: no asymmetry, masses, or scars   Heart: regular rate and " rhythm, S1, S2 normal, no murmur, click, rub or gallop   Lungs: clear to auscultation bilaterally   Abdomen: soft, non-tender, without masses or organomegaly   Breast: inspection negative, no nipple discharge or bleeding, no masses or nodularity palpable   Vulva: normal, Bartholin's, Urethra, Chestnut's normal   Vagina: normal mucosa   Cervix: no bleeding following Pap, no cervical motion tenderness, no lesions and nulliparous appearance   Uterus: anteverted, bulky, mobile, non-tender, normal shape and consistency   Adnexa: left adnexa negative, right adnexal fullness and tenderness noted   Rectal: not indicated     Assessment/Plan   Alyssa was seen today for gynecologic exam.    Diagnoses and all orders for this visit:    Well woman exam with routine gynecological exam  -     PapIG, HPV, Rfx 16 / 18    Endometriosis determined by laparoscopy    Menorrhagia with regular cycle    Encounter for screening for human papillomavirus (HPV)  -     PapIG, HPV, Rfx 16 / 18    ?enlarged uterus or right adnexal mass, plan pelvic US  Reviewed options for menorrhagia including cyclic progesterone and Novasure ablation (risks/benefits reviewed)    Breast self exam technique reviewed and patient encouraged to perform self-exam monthly.  Discussed healthy lifestyle modifications.  Pap smear sent

## 2020-07-29 LAB
CYTOLOGIST CVX/VAG CYTO: NORMAL
CYTOLOGY CVX/VAG DOC CYTO: NORMAL
CYTOLOGY CVX/VAG DOC THIN PREP: NORMAL
DX ICD CODE: NORMAL
HIV 1 & 2 AB SER-IMP: NORMAL
HPV I/H RISK 1 DNA CVX QL PROBE+SIG AMP: NEGATIVE
OTHER STN SPEC: NORMAL
STAT OF ADQ CVX/VAG CYTO-IMP: NORMAL

## 2020-08-31 ENCOUNTER — PROCEDURE VISIT (OUTPATIENT)
Dept: OBSTETRICS AND GYNECOLOGY | Age: 45
End: 2020-08-31

## 2020-08-31 ENCOUNTER — OFFICE VISIT (OUTPATIENT)
Dept: OBSTETRICS AND GYNECOLOGY | Age: 45
End: 2020-08-31

## 2020-08-31 VITALS
WEIGHT: 135.4 LBS | HEIGHT: 65 IN | SYSTOLIC BLOOD PRESSURE: 112 MMHG | DIASTOLIC BLOOD PRESSURE: 64 MMHG | BODY MASS INDEX: 22.56 KG/M2

## 2020-08-31 DIAGNOSIS — N94.6 DYSMENORRHEA: ICD-10-CM

## 2020-08-31 DIAGNOSIS — N92.0 MENORRHAGIA WITH REGULAR CYCLE: Primary | ICD-10-CM

## 2020-08-31 DIAGNOSIS — D25.1 INTRAMURAL LEIOMYOMA OF UTERUS: ICD-10-CM

## 2020-08-31 PROCEDURE — 99213 OFFICE O/P EST LOW 20 MIN: CPT | Performed by: OBSTETRICS & GYNECOLOGY

## 2020-08-31 PROCEDURE — 76830 TRANSVAGINAL US NON-OB: CPT | Performed by: OBSTETRICS & GYNECOLOGY

## 2020-08-31 NOTE — PROGRESS NOTES
"Subjective     Chief Complaint   Patient presents with   • Follow-up     Gyn follow up, US today       Alyssa Escamilla is a 45 y.o.  whose LMP is Patient's last menstrual period was 2020 (exact date). presents with h/o menorrhagia and dysmenorrhea. Prior annual exam ?enlarged uterus. Pt had moodiness with Lo loestrin. Disliked IUD in past. Would consider cyclic progesterone.       No Additional Complaints Reported    The following portions of the patient's history were reviewed and updated as appropriate:vital signs, allergies, current medications, past family history, past medical history, past social history, past surgical history and problem list      Review of Systems    A comprehensive review of systems was negative except for:   Genitourinary:positive for abnormal menstrual periods     Objective      /64   Ht 165.1 cm (65\")   Wt 61.4 kg (135 lb 6.4 oz)   LMP 2020 (Exact Date)   Breastfeeding No   BMI 22.53 kg/m²     Physical Exam    General:   alert, appears stated age and no distress   Heart:    Lungs:    Breast:    Neck:    Abdomen:    CVA:    Pelvis:    Extremities: Extremities normal, atraumatic, no cyanosis or edema   Neurologic: negative   Psychiatric: Normal affect, judgement, and mood       Lab Review   Labs: 2020 pap normal, HPV-     Imaging   Ultrasound - Pelvic Vaginal (images reviewed)  Uterus 8 x 5 x 3.8 cm EML 6.3 mm, normal ovaries, 2 small intramural fibroid (1.7 x 1.6 cm, 1.3 x 1.1 cm) that do not deviate the cavity    Assessment/Plan     ASSESSMENT  1. Menorrhagia with regular cycle    2. Intramural leiomyoma of uterus    3. Dysmenorrhea        PLAN  1. No orders of the defined types were placed in this encounter.      2. Medications prescribed this encounter:        New Medications Ordered This Visit   Medications   • progesterone (Prometrium) 200 MG capsule     Si po daily for 10 days each month     Dispense:  30 capsule     Refill:  3       3. We " also discussed endometrial ablation but proceed with caution with prior C/S. Reviewed risks of this procedure including infection/sepsis. Also risk of endometrial scarring causing hematometra which could cause chronic pain and need for hyst    Follow up: annual and prn    Cesia Huerta MD  8/31/2020

## 2020-09-10 ENCOUNTER — PROCEDURE VISIT (OUTPATIENT)
Dept: OBSTETRICS AND GYNECOLOGY | Age: 45
End: 2020-09-10

## 2020-09-10 ENCOUNTER — APPOINTMENT (OUTPATIENT)
Dept: WOMENS IMAGING | Facility: HOSPITAL | Age: 45
End: 2020-09-10

## 2020-09-10 DIAGNOSIS — Z12.31 VISIT FOR SCREENING MAMMOGRAM: Primary | ICD-10-CM

## 2020-09-10 PROCEDURE — 77067 SCR MAMMO BI INCL CAD: CPT | Performed by: OBSTETRICS & GYNECOLOGY

## 2020-09-10 PROCEDURE — 77067 SCR MAMMO BI INCL CAD: CPT | Performed by: RADIOLOGY

## 2022-10-25 ENCOUNTER — OFFICE VISIT (OUTPATIENT)
Dept: OBSTETRICS AND GYNECOLOGY | Age: 47
End: 2022-10-25

## 2022-10-25 VITALS
SYSTOLIC BLOOD PRESSURE: 122 MMHG | BODY MASS INDEX: 24.49 KG/M2 | DIASTOLIC BLOOD PRESSURE: 68 MMHG | HEIGHT: 65 IN | WEIGHT: 147 LBS

## 2022-10-25 DIAGNOSIS — R23.2 HOT FLASHES: Primary | ICD-10-CM

## 2022-10-25 DIAGNOSIS — N95.1 PERIMENOPAUSAL: ICD-10-CM

## 2022-10-25 PROCEDURE — 99213 OFFICE O/P EST LOW 20 MIN: CPT | Performed by: PHYSICIAN ASSISTANT

## 2022-10-25 NOTE — PROGRESS NOTES
"Subjective     Chief Complaint   Patient presents with   • Consult     Consult Hormones., c/o night sweats for 3 years, cycles are every 26-28 days. Aprox 10 days before cycle the night sweats starts so for 12 days straight she has them.  She is also having mood swings, joints are more achy, weight gain.       Alyssa Escamilla is a 47 y.o.  whose LMP is Patient's last menstrual period was 10/17/2022 (exact date). presents with hot flashes prior to menses    Onset of night sweats since 3 ya  In past 6 months has noted regular cycles every 26-27 days  Night sweats start 7 days prior  Wakes up freezing  Then with more night sweats for 2 more nights    No new med dx     Does have a pcp  Has been seen for routine check ups    Does have h/o       No Additional Complaints Reported    The following portions of the patient's history were reviewed and updated as appropriate:vital signs, allergies, current medications, past family history, past medical history, past social history, past surgical history and problem list      Review of Systems   Integument/breast: positive for heat intolerance    Objective      /68   Ht 165.1 cm (65\")   Wt 66.7 kg (147 lb)   LMP 10/17/2022 (Exact Date)   BMI 24.46 kg/m²     Physical Exam    General:   alert, comfortable and no distress   Heart: Not performed today   Lungs: Not performed today.   Breast: Not performed today   Neck: Not performed today   Abdomen: Not performed today   CVA: Not performed today   Pelvis: Not performed today   Extremities: Not performed today   Neurologic: negative   Psychiatric: Normal affect, judgement, and mood       Lab Review   Labs: No data reviewed    Imaging   No data reviewed    Assessment & Plan     ASSESSMENT  1. Hot flashes    2. Perimenopausal          PLAN  1.   Orders Placed This Encounter   Procedures   • TSH   • Follicle Stimulating Hormone   • Hemoglobin A1c       2. Medications prescribed this encounter:        New Medications " Ordered This Visit   Medications   • norelgestromin-ethinyl estradiol (ORTHO EVRA) 150-35 MCG/24HR     Sig: Place 1 patch on the skin as directed by provider 1 (One) Time Per Week.     Dispense:  3 patch     Refill:  12       3. Disc perimenopausal sx's.  Will r/o thyroid and sugar issues but suspect perimenopause. Gave h/o to pt on it as well. Can start ortho evra patch to help stabilize hormones. Call if sx's don't improve. Plan f/u at annual exam or call if sx's don't improve in meantime    Follow up: 3 month(s)    GEORGE Prather  10/25/2022

## 2022-10-26 ENCOUNTER — TELEPHONE (OUTPATIENT)
Dept: OBSTETRICS AND GYNECOLOGY | Age: 47
End: 2022-10-26

## 2022-10-26 LAB
FSH SERPL-ACNC: 7 MIU/ML
HBA1C MFR BLD: 5.2 % (ref 4.8–5.6)
TSH SERPL DL<=0.005 MIU/L-ACNC: 1.64 UIU/ML (ref 0.27–4.2)

## 2023-02-14 ENCOUNTER — PROCEDURE VISIT (OUTPATIENT)
Dept: OBSTETRICS AND GYNECOLOGY | Age: 48
End: 2023-02-14
Payer: COMMERCIAL

## 2023-02-14 ENCOUNTER — OFFICE VISIT (OUTPATIENT)
Dept: OBSTETRICS AND GYNECOLOGY | Age: 48
End: 2023-02-14
Payer: COMMERCIAL

## 2023-02-14 VITALS
BODY MASS INDEX: 24.16 KG/M2 | DIASTOLIC BLOOD PRESSURE: 66 MMHG | SYSTOLIC BLOOD PRESSURE: 112 MMHG | HEIGHT: 65 IN | WEIGHT: 145 LBS

## 2023-02-14 DIAGNOSIS — Z12.4 ENCOUNTER FOR PAPANICOLAOU SMEAR FOR CERVICAL CANCER SCREENING: ICD-10-CM

## 2023-02-14 DIAGNOSIS — Z11.51 SCREENING FOR HPV (HUMAN PAPILLOMAVIRUS): ICD-10-CM

## 2023-02-14 DIAGNOSIS — Z01.419 WELL WOMAN EXAM WITH ROUTINE GYNECOLOGICAL EXAM: Primary | ICD-10-CM

## 2023-02-14 DIAGNOSIS — Z12.31 VISIT FOR SCREENING MAMMOGRAM: Primary | ICD-10-CM

## 2023-02-14 PROCEDURE — 99396 PREV VISIT EST AGE 40-64: CPT | Performed by: PHYSICIAN ASSISTANT

## 2023-02-14 PROCEDURE — 77067 SCR MAMMO BI INCL CAD: CPT | Performed by: PHYSICIAN ASSISTANT

## 2023-02-14 PROCEDURE — 77063 BREAST TOMOSYNTHESIS BI: CPT | Performed by: PHYSICIAN ASSISTANT

## 2023-02-14 NOTE — PROGRESS NOTES
Subjective     Chief Complaint   Patient presents with   • Gynecologic Exam     annual exam last pap 2020 neg/neg, m/g done today, c/scope 2017, (only used the patch a short period of time made her sick to her stomach.)       History of Present Illness    Alyssa Escamilla is a 47 y.o.  who presents for annual exam.    She is doing well  Has no c/o    Did take the patch but only used it for 48 hours, caused nausea  Was using it to help with night sweats  Doesn't required BC, had TL  She has been taking an otc supplement from [employer] and it has helped with her sx's  Declines anything additional otherwise    Sees pcp routinely  Had labs done recently  Also sees derm routinely  Had mammogram today  CSC in 2017    Her menses are regular every 28-30 days, lasting 4-7 days, dysmenorrhea none   Obstetric History:  OB History        1    Para   1    Term   0       1    AB        Living   1       SAB        IAB        Ectopic        Molar        Multiple        Live Births   1               Menstrual History:     Patient's last menstrual period was 2023 (exact date).         Current contraception: tubal ligation  History of abnormal Pap smear: no  Received Gardasil immunization: no  Perform regular self breast exam: yes - occl  Family history of uterine or ovarian cancer: no  Family History of colon cancer: yes - pancreatic in second cousin on mom's side  Family history of breast cancer: no    Mammogram: done today.  Colonoscopy: up to date. Done in 2017, will contact Dr singh to see when due  DEXA: not indicated.    Exercise: moderately active  Calcium/Vitamin D: adequate intake    The following portions of the patient's history were reviewed and updated as appropriate: allergies, current medications, past family history, past medical history, past social history, past surgical history and problem list.    Review of Systems   All other systems reviewed and are  "negative.      Review of Systems   Constitutional: Negative for fatigue.   Respiratory: Negative for shortness of breath.    Gastrointestinal: Negative for abdominal pain.   Genitourinary: Negative for dysuria.   Neurological: Negative for headaches.   Psychiatric/Behavioral: Negative for dysphoric mood.         Objective   Physical Exam    /66   Ht 165.1 cm (65\")   Wt 65.8 kg (145 lb)   LMP 01/30/2023 (Exact Date)   BMI 24.13 kg/m²   General:   alert, comfortable and no distress   Heart: regular rate and rhythm   Lungs: clear to auscultation bilaterally   Breast: normal appearance, no masses or tenderness, Inspection negative, No nipple retraction or dimpling, No nipple discharge or bleeding, No axillary or supraclavicular adenopathy, Normal to palpation without dominant masses   Neck: no adenopathy and no carotid bruit   Abdomen: normal findings: soft, non-tender   CVA: Not performed today   Pelvis: External genitalia: normal general appearance  Vaginal: normal mucosa without prolapse or lesions  Cervix: normal appearance and thin prep PAP obtained  Adnexa: normal bimanual exam  Uterus: normal single, nontender   Extremities: Not performed today   Neurologic: negative   Psychiatric: Normal affect, judgement, and mood     Assessment & Plan   Diagnoses and all orders for this visit:    1. Well woman exam with routine gynecological exam (Primary)    2. Encounter for Papanicolaou smear for cervical cancer screening  -     IGP, Aptima HPV, Rfx 16 / 18,45    3. Screening for HPV (human papillomavirus)  -     IGP, Aptima HPV, Rfx 16 / 18,45        All questions answered.  Breast self exam technique reviewed and patient encouraged to perform self-exam monthly.  Discussed healthy lifestyle modifications.  Recommended 30 minutes of aerobic exercise five times per week.  Discussed calcium needs to prevent osteoporosis.      Pap done  Mammogram done  Back in 1 year or sooner prn             "

## 2023-02-20 LAB
CYTOLOGIST CVX/VAG CYTO: NORMAL
CYTOLOGY CVX/VAG DOC CYTO: NORMAL
CYTOLOGY CVX/VAG DOC THIN PREP: NORMAL
DX ICD CODE: NORMAL
HIV 1 & 2 AB SER-IMP: NORMAL
HPV GENOTYPE REFLEX: NORMAL
HPV I/H RISK 4 DNA CVX QL PROBE+SIG AMP: NEGATIVE
OTHER STN SPEC: NORMAL
STAT OF ADQ CVX/VAG CYTO-IMP: NORMAL

## 2023-02-22 RX ORDER — FLUCONAZOLE 150 MG/1
150 TABLET ORAL ONCE
Qty: 1 TABLET | Refills: 0 | Status: SHIPPED | OUTPATIENT
Start: 2023-02-22 | End: 2023-03-10 | Stop reason: SDUPTHER

## 2023-03-10 RX ORDER — FLUCONAZOLE 150 MG/1
150 TABLET ORAL ONCE
Qty: 1 TABLET | Refills: 0 | Status: SHIPPED | OUTPATIENT
Start: 2023-03-10 | End: 2023-03-10

## 2023-04-07 RX ORDER — NORETHINDRONE ACETATE AND ETHINYL ESTRADIOL, ETHINYL ESTRADIOL AND FERROUS FUMARATE 1MG-10(24)
1 KIT ORAL DAILY
Qty: 28 TABLET | Refills: 11 | Status: SHIPPED | OUTPATIENT
Start: 2023-04-07 | End: 2023-05-05

## 2024-09-03 ENCOUNTER — TELEPHONE (OUTPATIENT)
Dept: OBSTETRICS AND GYNECOLOGY | Age: 49
End: 2024-09-03

## 2024-09-03 DIAGNOSIS — Z12.31 SCREENING MAMMOGRAM FOR BREAST CANCER: Primary | ICD-10-CM

## 2024-09-03 NOTE — TELEPHONE ENCOUNTER
Caller: Alyssa Escamilla    Relationship to patient: Self    Best call back number: [phone]    Patient is needing: EST PT OF THE OFFICE SCHEDULED FOR ANNUAL ON 10/17/2024. PT CALLING TO SCHEDULE YEARLY MAMMOGRAM. NO IMPLANTS

## 2024-10-30 ENCOUNTER — HOSPITAL ENCOUNTER (OUTPATIENT)
Facility: HOSPITAL | Age: 49
Discharge: HOME | End: 2024-10-30
Admitting: PHYSICIAN ASSISTANT
Payer: COMMERCIAL

## 2024-10-30 DIAGNOSIS — Z12.31 SCREENING MAMMOGRAM FOR BREAST CANCER: ICD-10-CM

## 2024-10-30 PROCEDURE — 77063 BREAST TOMOSYNTHESIS BI: CPT

## 2024-10-30 PROCEDURE — 77067 SCR MAMMO BI INCL CAD: CPT

## 2024-11-11 ENCOUNTER — OFFICE VISIT (OUTPATIENT)
Dept: OBSTETRICS AND GYNECOLOGY | Age: 49
End: 2024-11-11
Payer: COMMERCIAL

## 2024-11-11 VITALS
DIASTOLIC BLOOD PRESSURE: 64 MMHG | HEIGHT: 65 IN | BODY MASS INDEX: 21.16 KG/M2 | WEIGHT: 127 LBS | SYSTOLIC BLOOD PRESSURE: 112 MMHG

## 2024-11-11 DIAGNOSIS — Z01.419 WELL WOMAN EXAM WITH ROUTINE GYNECOLOGICAL EXAM: Primary | ICD-10-CM

## 2024-11-11 PROCEDURE — 99396 PREV VISIT EST AGE 40-64: CPT | Performed by: PHYSICIAN ASSISTANT

## 2024-11-11 NOTE — PROGRESS NOTES
Subjective     Chief Complaint   Patient presents with    Annual Exam     Annual exam  last pap 2023 neg/neg,   m/g 10/30/2024   cologuard  neg       History of Present Illness    Alyssa Escamilla is a 49 y.o.  who presents for annual exam.    She is noting that her periods are spaced out, now 36 days apart  No other sxs currently  Did have h/o perimenopause sxs but no longer occurring    She is utd on cologuard  Had mammogram last week    Down 18 lbs, working on intentional weight loss    Her menses are regular every 36 days, lasting 4-7 days, dysmenorrhea none   Obstetric History:  OB History          1    Para   1    Term   0       1    AB        Living   1         SAB        IAB        Ectopic        Molar        Multiple        Live Births   1               Menstrual History:     Patient's last menstrual period was 10/28/2024 (exact date).         Current contraception:  salpingectomy  History of abnormal Pap smear: no  Received Gardasil immunization: no  Perform regular self breast exam: yes - occl  Family history of uterine or ovarian cancer: no  Family History of colon cancer: no  Family history of breast cancer: no    Mammogram: up to date.  Colonoscopy: up to date.  DEXA: not indicated.    Exercise: moderately active  Calcium/Vitamin D: adequate intake    The following portions of the patient's history were reviewed and updated as appropriate: allergies, current medications, past family history, past medical history, past social history, past surgical history, and problem list.    Review of Systems   All other systems reviewed and are negative.      Review of Systems   Constitutional: Negative for fatigue.   Respiratory: Negative for shortness of breath.    Gastrointestinal: Negative for abdominal pain.   Genitourinary: Negative for dysuria.   Neurological: Negative for headaches.   Psychiatric/Behavioral: Negative for dysphoric mood.         Objective   Physical  "Exam    /64   Ht 165.1 cm (65\")   Wt 57.6 kg (127 lb)   LMP 10/28/2024 (Exact Date)   BMI 21.13 kg/m²   General:   alert, comfortable, and no distress   Heart: regular rate and rhythm   Lungs: clear to auscultation bilaterally   Breast: normal appearance, no masses or tenderness, Inspection negative, No nipple retraction or dimpling, No nipple discharge or bleeding, No axillary or supraclavicular adenopathy, Normal to palpation without dominant masses   Neck: no adenopathy and no carotid bruit   Abdomen: Not performed today   CVA: Not performed today   Pelvis: External genitalia: normal general appearance  Vaginal: normal mucosa without prolapse or lesions  Cervix: normal appearance  Adnexa: normal bimanual exam  Uterus: normal single, nontender   Extremities: Not performed today   Neurologic: negative   Psychiatric: Normal affect, judgement, and mood       Assessment & Plan   Diagnoses and all orders for this visit:    1. Well woman exam with routine gynecological exam (Primary)        All questions answered.  Breast self exam technique reviewed and patient encouraged to perform self-exam monthly.  Discussed healthy lifestyle modifications.  Recommended 30 minutes of aerobic exercise five times per week.  Discussed calcium needs to prevent osteoporosis.      Pap utd  Disc labial revision, notes pain with IC and rubbing against her underwear. May set up a visit with Dr barnes to discuss possible revision             "

## (undated) DEVICE — THE TORRENT IRRIGATION SCOPE CONNECTOR IS USED WITH THE TORRENT IRRIGATION TUBING TO PROVIDE IRRIGATION FLUIDS SUCH AS STERILE WATER DURING GASTROINTESTINAL ENDOSCOPIC PROCEDURES WHEN USED IN CONJUNCTION WITH AN IRRIGATION PUMP (OR ELECTROSURGICAL UNIT).: Brand: TORRENT

## (undated) DEVICE — Device: Brand: DEFENDO AIR/WATER/SUCTION AND BIOPSY VALVE

## (undated) DEVICE — CANN NASL CO2 TRULINK W/O2 A/

## (undated) DEVICE — TUBING, SUCTION, 1/4" X 10', STRAIGHT: Brand: MEDLINE